# Patient Record
Sex: FEMALE | Race: AMERICAN INDIAN OR ALASKA NATIVE
[De-identification: names, ages, dates, MRNs, and addresses within clinical notes are randomized per-mention and may not be internally consistent; named-entity substitution may affect disease eponyms.]

---

## 2018-04-06 ENCOUNTER — HOSPITAL ENCOUNTER (EMERGENCY)
Dept: HOSPITAL 5 - ED | Age: 28
LOS: 1 days | Discharge: HOME | End: 2018-04-07
Payer: MEDICAID

## 2018-04-06 DIAGNOSIS — R07.89: Primary | ICD-10-CM

## 2018-04-06 LAB
BACTERIA #/AREA URNS HPF: (no result) /HPF
BASOPHILS # (AUTO): 0 K/MM3 (ref 0–0.1)
BASOPHILS NFR BLD AUTO: 0.2 % (ref 0–1.8)
BILIRUB UR QL STRIP: (no result)
BLOOD UR QL VISUAL: (no result)
BUN SERPL-MCNC: 15 MG/DL (ref 7–17)
BUN/CREAT SERPL: 21 %
CALCIUM SERPL-MCNC: 9.7 MG/DL (ref 8.4–10.2)
EOSINOPHIL # BLD AUTO: 0 K/MM3 (ref 0–0.4)
EOSINOPHIL NFR BLD AUTO: 0.4 % (ref 0–4.3)
HCT VFR BLD CALC: 37 % (ref 30.3–42.9)
HEMOLYSIS INDEX: 12
HGB BLD-MCNC: 12.6 GM/DL (ref 10.1–14.3)
LYMPHOCYTES # BLD AUTO: 2.2 K/MM3 (ref 1.2–5.4)
LYMPHOCYTES NFR BLD AUTO: 24.2 % (ref 13.4–35)
MCH RBC QN AUTO: 31 PG (ref 28–32)
MCHC RBC AUTO-ENTMCNC: 34 % (ref 30–34)
MCV RBC AUTO: 92 FL (ref 79–97)
MONOCYTES # (AUTO): 0.5 K/MM3 (ref 0–0.8)
MONOCYTES % (AUTO): 5.8 % (ref 0–7.3)
PH UR STRIP: 6 [PH] (ref 5–7)
PLATELET # BLD: 242 K/MM3 (ref 140–440)
PROT UR STRIP-MCNC: (no result) MG/DL
RBC # BLD AUTO: 4.03 M/MM3 (ref 3.65–5.03)
RBC #/AREA URNS HPF: 2 /HPF (ref 0–6)
T4 FREE SERPL-MCNC: 1.25 NG/DL (ref 0.76–1.46)
UROBILINOGEN UR-MCNC: < 2 MG/DL (ref ?–2)
WBC #/AREA URNS HPF: 11 /HPF (ref 0–6)

## 2018-04-06 PROCEDURE — 85379 FIBRIN DEGRADATION QUANT: CPT

## 2018-04-06 PROCEDURE — 36415 COLL VENOUS BLD VENIPUNCTURE: CPT

## 2018-04-06 PROCEDURE — 96374 THER/PROPH/DIAG INJ IV PUSH: CPT

## 2018-04-06 PROCEDURE — 81025 URINE PREGNANCY TEST: CPT

## 2018-04-06 PROCEDURE — 81001 URINALYSIS AUTO W/SCOPE: CPT

## 2018-04-06 PROCEDURE — 71045 X-RAY EXAM CHEST 1 VIEW: CPT

## 2018-04-06 PROCEDURE — 84443 ASSAY THYROID STIM HORMONE: CPT

## 2018-04-06 PROCEDURE — 84439 ASSAY OF FREE THYROXINE: CPT

## 2018-04-06 PROCEDURE — 93010 ELECTROCARDIOGRAM REPORT: CPT

## 2018-04-06 PROCEDURE — 96375 TX/PRO/DX INJ NEW DRUG ADDON: CPT

## 2018-04-06 PROCEDURE — 84484 ASSAY OF TROPONIN QUANT: CPT

## 2018-04-06 PROCEDURE — 80048 BASIC METABOLIC PNL TOTAL CA: CPT

## 2018-04-06 PROCEDURE — 85025 COMPLETE CBC W/AUTO DIFF WBC: CPT

## 2018-04-06 PROCEDURE — 71275 CT ANGIOGRAPHY CHEST: CPT

## 2018-04-06 PROCEDURE — 93005 ELECTROCARDIOGRAM TRACING: CPT

## 2018-04-06 PROCEDURE — 99285 EMERGENCY DEPT VISIT HI MDM: CPT

## 2018-04-07 VITALS — DIASTOLIC BLOOD PRESSURE: 64 MMHG | SYSTOLIC BLOOD PRESSURE: 103 MMHG

## 2018-04-07 NOTE — CAT SCAN REPORT
FINAL REPORT



PROCEDURE:  CT ANGIO CHEST



TECHNIQUE:  Computerized tomographic angiography of the chest was

performed after the IV injection of iodinated nonionic contrast

including image processing. The image data was postprocessed

using 2-dimensional multiplanar reformatted (MPR) and

3-dimensional (MIP and/or volume rendered) techniques. 



HISTORY:  CHEST PAIN 



COMPARISON:  No prior studies are available for comparison.



FINDINGS:  

Heart and pericardium: Normal.



Thoracic aorta: Normal.



Pulmonary vasculature: Normal.



Lymph nodes: No enlarged thoracic lymph nodes.



Lungs: Lungs are clear and expanded. There are no infiltrates,

effusions or pneumothoraces.



Pleural space: No effusion, thickening, or pneumothorax.



Musculoskeletal structures: No significant abnormality.



Upper abdominal structures: No significant abnormality.



IMPRESSION:  

There is no pulmonary embolism.



There is no thoracic aortic aneurysm or dissection.



Lungs are clear.

## 2018-04-07 NOTE — XRAY REPORT
FINAL REPORT



EXAM:  XR CHEST 1V AP



HISTORY:  CHEST PAIN 



TECHNIQUE:  AP portable view of the chest.



PRIORS:  None.



FINDINGS:  

The cardiomediastinal silhouette appears normal. The lungs are

clear. The bones and soft tissues are unremarkable.



IMPRESSION:  

No evidence of acute cardiopulmonary disease.

## 2018-04-07 NOTE — EMERGENCY DEPARTMENT REPORT
HPI





- General


Chief Complaint: Chest Pain


Time Seen by Provider: 04/07/18 02:08





- HPI


HPI: 





27-year-old female presents to the ER with left-sided chest pain, started 

earlier this morning, pain was 8 out of 10, sharp without radiation.  Patient 

denies any shortness of breath, nausea, vomiting, diaphoresis with her 

symptoms.  Patient did not take any medications for these symptoms.  Patient 

denies any alleviating or exacerbating factors.





ED Past Medical Hx





- Past Medical History


Previous Medical History?: No


Hx Hypertension: No





- Surgical History


Past Surgical History?: No





- Social History


Smoking Status: Never Smoker


Substance Use Type: None





- Medications


Home Medications: 


 Home Medications











 Medication  Instructions  Recorded  Confirmed  Last Taken  Type


 


Ketorolac [Toradol] 10 mg PO Q6H PRN #20 tablet 04/07/18  Unknown Rx














ED Review of Systems


ROS: 


Stated complaint: CHEST PAIN


Other details as noted in HPI





Comment: All other systems reviewed and negative


Cardiovascular: chest pain


Gastrointestinal: denies: abdominal pain, nausea, vomiting





Physical Exam





- Physical Exam


Vital Signs: 


 Vital Signs











  04/06/18 04/07/18 04/07/18





  18:43 01:05 01:15


 


Temperature 98.6 F  


 


Pulse Rate 92 H  84


 


Respiratory 18  13





Rate   


 


Blood Pressure 118/71  109/73


 


Blood Pressure   





[Left]   


 


O2 Sat by Pulse 100 86 98





Oximetry   














  04/07/18 04/07/18 04/07/18





  01:28 01:31 01:37


 


Temperature  98.4 F 


 


Pulse Rate  96 H 87


 


Respiratory 19 18 





Rate   


 


Blood Pressure  117/67 


 


Blood Pressure  109/73 





[Left]   


 


O2 Sat by Pulse 98 94 





Oximetry   














  04/07/18 04/07/18





  01:45 02:00


 


Temperature  


 


Pulse Rate 89 110 H


 


Respiratory 17 17





Rate  


 


Blood Pressure 117/67 108/60


 


Blood Pressure  





[Left]  


 


O2 Sat by Pulse 99 99





Oximetry  











Physical Exam: 





- Physical Exam


Physical Exam: 





- General


Limitations: No Limitations


General appearance: alert, in no apparent distress.





- Head


Head exam: Present: atraumatic, normocephalic





- Eye


Eye exam: Present: normal appearance





- ENT


ENT exam: Present: mucous membranes moist





- Neck


Neck exam: Present: normal inspection





- Respiratory


Respiratory exam: Present: normal lung sounds bilaterally.  Absent: respiratory 

distress





- Cardiovascular


Cardiovascular Exam: Present: normal rhythm, normal rate.  Absent: systolic 

murmur, diastolic murmur, rubs, gallop





- GI/Abdominal


GI/Abdominal exam: Present: soft, normal bowel sounds





- Extremities Exam


Extremities exam: Present: normal inspection





- Back Exam


Back exam: Present: normal inspection





- Neurological Exam


Neurological exam: Present: alert, oriented X3





- Psychiatric


Psychiatric exam: normal affect and mood





- Skin


Skin exam: Present: warm, dry, intact, normal color.  Absent: rash





ED Course


 Vital Signs











  04/06/18 04/07/18 04/07/18





  18:43 01:05 01:15


 


Temperature 98.6 F  


 


Pulse Rate 92 H  84


 


Respiratory 18  13





Rate   


 


Blood Pressure 118/71  109/73


 


Blood Pressure   





[Left]   


 


O2 Sat by Pulse 100 86 98





Oximetry   














  04/07/18 04/07/18 04/07/18





  01:28 01:31 01:37


 


Temperature  98.4 F 


 


Pulse Rate  96 H 87


 


Respiratory 19 18 





Rate   


 


Blood Pressure  117/67 


 


Blood Pressure  109/73 





[Left]   


 


O2 Sat by Pulse 98 94 





Oximetry   














  04/07/18 04/07/18





  01:45 02:00


 


Temperature  


 


Pulse Rate 89 110 H


 


Respiratory 17 17





Rate  


 


Blood Pressure 117/67 108/60


 


Blood Pressure  





[Left]  


 


O2 Sat by Pulse 99 99





Oximetry  














ED Medical Decision Making





- Lab Data


Result diagrams: 


 04/06/18 18:50





 04/06/18 18:50


Critical care attestation.: 


If time is entered above; I have spent that time in minutes in the direct care 

of this critically ill patient, excluding procedure time.








ED Disposition


Clinical Impression: 


Chest pain


Qualifiers:


 Chest pain type: other chest pain Qualified Code(s): R07.89 - Other chest pain





Disposition: DC-01 TO HOME OR SELFCARE


Is pt being admited?: No


Does the pt Need Aspirin: No


Condition: Stable


Instructions:  Chest Pain (ED)


Prescriptions: 


Ketorolac [Toradol] 10 mg PO Q6H PRN #20 tablet


 PRN Reason: Pain


Referrals: 


MEME VALDOVINOS MD [Primary Care Provider] - 3-5 Days